# Patient Record
Sex: FEMALE | Race: WHITE | NOT HISPANIC OR LATINO | Employment: OTHER | URBAN - METROPOLITAN AREA
[De-identification: names, ages, dates, MRNs, and addresses within clinical notes are randomized per-mention and may not be internally consistent; named-entity substitution may affect disease eponyms.]

---

## 2019-10-21 ENCOUNTER — HOSPITAL ENCOUNTER (OUTPATIENT)
Dept: OTHER | Facility: HOSPITAL | Age: 74
Discharge: HOME | End: 2019-10-21

## 2020-11-18 ENCOUNTER — HOSPITAL ENCOUNTER (OUTPATIENT)
Dept: OTHER | Facility: HOSPITAL | Age: 75
Discharge: HOME | End: 2020-11-18

## 2022-04-07 ENCOUNTER — HOSPITAL ENCOUNTER (OUTPATIENT)
Dept: OTHER | Facility: HOSPITAL | Age: 77
Discharge: HOME | End: 2022-04-07

## 2023-06-26 ENCOUNTER — HOSPITAL ENCOUNTER (OUTPATIENT)
Dept: OTHER | Facility: HOSPITAL | Age: 78
Discharge: HOME | End: 2023-06-26

## 2024-07-23 ENCOUNTER — OFFICE VISIT (OUTPATIENT)
Dept: FAMILY MEDICINE CLINIC | Facility: CLINIC | Age: 79
End: 2024-07-23
Payer: MEDICARE

## 2024-07-23 VITALS
OXYGEN SATURATION: 94 % | HEIGHT: 62 IN | DIASTOLIC BLOOD PRESSURE: 64 MMHG | BODY MASS INDEX: 19.88 KG/M2 | HEART RATE: 73 BPM | WEIGHT: 108 LBS | SYSTOLIC BLOOD PRESSURE: 167 MMHG

## 2024-07-23 DIAGNOSIS — Z00.00 ENCOUNTER FOR MEDICAL EXAMINATION TO ESTABLISH CARE: Primary | ICD-10-CM

## 2024-07-23 DIAGNOSIS — J41.0 SIMPLE CHRONIC BRONCHITIS: ICD-10-CM

## 2024-07-23 DIAGNOSIS — Z86.73 HISTORY OF CVA (CEREBROVASCULAR ACCIDENT): ICD-10-CM

## 2024-07-23 DIAGNOSIS — Z78.0 POST-MENOPAUSAL: ICD-10-CM

## 2024-07-23 PROBLEM — J44.9 COPD (CHRONIC OBSTRUCTIVE PULMONARY DISEASE): Status: ACTIVE | Noted: 2024-07-23

## 2024-07-23 PROBLEM — I10 ESSENTIAL HYPERTENSION: Status: ACTIVE | Noted: 2024-07-23

## 2024-07-23 PROBLEM — E78.5 HYPERLIPIDEMIA: Status: ACTIVE | Noted: 2024-07-23

## 2024-07-23 PROCEDURE — 3078F DIAST BP <80 MM HG: CPT | Performed by: STUDENT IN AN ORGANIZED HEALTH CARE EDUCATION/TRAINING PROGRAM

## 2024-07-23 PROCEDURE — 99204 OFFICE O/P NEW MOD 45 MIN: CPT | Performed by: STUDENT IN AN ORGANIZED HEALTH CARE EDUCATION/TRAINING PROGRAM

## 2024-07-23 PROCEDURE — 3077F SYST BP >= 140 MM HG: CPT | Performed by: STUDENT IN AN ORGANIZED HEALTH CARE EDUCATION/TRAINING PROGRAM

## 2024-07-23 RX ORDER — ALBUTEROL SULFATE 90 UG/1
2 AEROSOL, METERED RESPIRATORY (INHALATION) EVERY 6 HOURS PRN
COMMUNITY
Start: 2024-03-04

## 2024-07-23 RX ORDER — CYCLOBENZAPRINE HCL 10 MG
10 TABLET ORAL 3 TIMES DAILY PRN
COMMUNITY
Start: 2024-02-26

## 2024-07-23 RX ORDER — CHOLECALCIFEROL (VITAMIN D3) 25 MCG
1000 TABLET ORAL DAILY
COMMUNITY

## 2024-07-23 RX ORDER — METOPROLOL SUCCINATE 50 MG/1
1 TABLET, EXTENDED RELEASE ORAL DAILY
COMMUNITY
Start: 2024-05-02

## 2024-07-23 RX ORDER — MECLIZINE HCL 12.5 MG/1
12.5 TABLET ORAL 3 TIMES DAILY PRN
COMMUNITY
Start: 2024-05-02 | End: 2024-07-23 | Stop reason: SDUPTHER

## 2024-07-23 RX ORDER — ROSUVASTATIN CALCIUM 20 MG/1
20 TABLET, COATED ORAL
COMMUNITY
Start: 2024-05-27

## 2024-07-23 RX ORDER — BIOTIN 10 MG
10 TABLET ORAL DAILY
COMMUNITY

## 2024-07-23 RX ORDER — VALSARTAN 160 MG/1
1 TABLET ORAL DAILY
COMMUNITY
Start: 2024-06-09 | End: 2024-07-23 | Stop reason: SDUPTHER

## 2024-07-23 RX ORDER — VALSARTAN 160 MG/1
160 TABLET ORAL DAILY
COMMUNITY
Start: 2024-06-09

## 2024-07-23 RX ORDER — ASPIRIN 81 MG/1
81 TABLET ORAL DAILY
Qty: 90 TABLET | Refills: 1 | Status: SHIPPED | OUTPATIENT
Start: 2024-07-23

## 2024-07-23 RX ORDER — MECLIZINE HCL 12.5 MG/1
12.5 TABLET ORAL EVERY 8 HOURS PRN
COMMUNITY
Start: 2024-05-02

## 2024-07-23 RX ORDER — ALBUTEROL SULFATE 2.5 MG/3ML
2.5 SOLUTION RESPIRATORY (INHALATION) EVERY 4 HOURS PRN
COMMUNITY
Start: 2024-03-12

## 2024-07-23 NOTE — PROGRESS NOTES
Subjective:       Yaneth Anderson is a 79 y.o. female with a concurrent medical history of essential hypertension, hyperlipidemia,  COPD, vertigo, and muscle cramps and a past medical history of CVA who presents to establish care.    In terms of essential hypertension, current medications include valsartan 160 mg and metoprolol succinate 50 mg.  Blood pressure currently uncontrolled at 167/64 - but she did not take her medications today. Yesterday 90/59. Ms. Anderson tells me that her blood pressure goes up and down. We did discuss that with history of CVA, we would like tighter control but also want to avoid hypotension.     GFR normal on CMP earlier this month.     Will have her follow up in three months.     In terms of hyperlipidemia, current medication includes rosuvastatin 20 mg. LDL>100 earlier this month on outside labwork. However, she says she was not fasting. We will wait on next lipid panel before making adjustments since she was not fasting.     In terms of COPD, current medication includes Symbicort and albuterol as needed. She has stopped smoking 24 years ago. Denies cough. Has dyspnea on exertion. Discussed that optimal regimen would be to have her on an antimuscarinic inhaler.  Would trial breztri inhaler.     In terms of vertigo, she takes meclizine as needed. She has seen ENT and is prescribed meclizine. We will work to get her outside records.     She takes flexeril for cramps in legs, feet, and hands.     She reports a past medical history of CVA 33 years ago. She was on aspirin previously but was taken off. Denies bleed.        Penicillin show up as an allergy but she denies side effect to penicillins or allergy to them.  She is not sure how this became a side effect for her.    The following portions of the patient's history were reviewed and updated as appropriate: allergies, current medications, past family history, past medical history, past social history, past surgical history, and problem  list.    Past Medical Hx:  Past Medical History:   Diagnosis Date    Asthma     Cancer     COPD (chronic obstructive pulmonary disease)     History of medical problems     Hyperlipidemia     Hypertension     Kidney stone     Stroke        Past Surgical Hx:  Past Surgical History:   Procedure Laterality Date    CARDIAC CATHETERIZATION      EYE SURGERY      HERNIA REPAIR      LARYNX SURGERY      TUBAL ABDOMINAL LIGATION         Current Meds:    Current Outpatient Medications:     albuterol (PROVENTIL) (2.5 MG/3ML) 0.083% nebulizer solution, Take 2.5 mg by nebulization Every 4 (Four) Hours As Needed., Disp: , Rfl:     albuterol sulfate  (90 Base) MCG/ACT inhaler, Inhale 2 puffs Every 6 (Six) Hours As Needed., Disp: , Rfl:     Biotin 10 MG tablet, Take 10 mg by mouth Daily., Disp: , Rfl:     Calcium Carbonate-Vit D-Min (Calcium 600 + Minerals) 600-200 MG-UNIT tablet, Take 1 tablet by mouth Daily., Disp: , Rfl:     Cholecalciferol 25 MCG (1000 UT) tablet, Take 1 tablet by mouth Daily., Disp: , Rfl:     cyanocobalamin (VITAMIN B-12) 1000 MCG tablet, Take 1 tablet by mouth Daily., Disp: , Rfl:     cyclobenzaprine (FLEXERIL) 10 MG tablet, Take 1 tablet by mouth 3 (Three) Times a Day As Needed., Disp: , Rfl:     meclizine (ANTIVERT) 12.5 MG tablet, Take 1 tablet by mouth Every 8 (Eight) Hours As Needed., Disp: , Rfl:     metoprolol succinate XL (TOPROL-XL) 50 MG 24 hr tablet, Take 1 tablet by mouth Daily., Disp: , Rfl:     rosuvastatin (CRESTOR) 20 MG tablet, Take 1 tablet by mouth every night at bedtime., Disp: , Rfl:     valsartan (DIOVAN) 160 MG tablet, Take 1 tablet by mouth Daily., Disp: , Rfl:     aspirin 81 MG EC tablet, Take 1 tablet by mouth Daily., Disp: 90 tablet, Rfl: 1    Budeson-Glycopyrrol-Formoterol (BREZTRI) 160-9-4.8 MCG/ACT aerosol inhaler, Inhale 2 puffs 2 (Two) Times a Day., Disp: 5.9 g, Rfl: 2    Allergies:  Allergies   Allergen Reactions    Penicillins Unknown - Low Severity    Tape Nausea Only  "and Other (See Comments)     \"Rips my skin\"       Family Hx:  Family History   Problem Relation Age of Onset    Hyperlipidemia Mother     Heart disease Mother     Hyperlipidemia Father     Heart disease Father     Diabetes Brother         Social History:  Social History     Socioeconomic History    Marital status:    Tobacco Use    Smoking status: Former     Types: Cigarettes     Passive exposure: Past    Smokeless tobacco: Never   Vaping Use    Vaping status: Never Used   Substance and Sexual Activity    Alcohol use: Not Currently    Drug use: Defer    Sexual activity: Defer       Review of Systems  Review of Systems   Respiratory:  Positive for shortness of breath (on exertion). Negative for cough and wheezing.        Objective:     /64 (BP Location: Right arm, Patient Position: Sitting)   Pulse 73   Ht 157.5 cm (62\")   Wt 49 kg (108 lb)   SpO2 94%   BMI 19.75 kg/m²   Physical Exam  Constitutional:       General: She is not in acute distress.     Appearance: Normal appearance. She is normal weight. She is not ill-appearing, toxic-appearing or diaphoretic.   Cardiovascular:      Rate and Rhythm: Normal rate and regular rhythm.   Pulmonary:      Effort: Pulmonary effort is normal.      Breath sounds: Normal breath sounds.   Neurological:      Mental Status: She is alert.   Psychiatric:         Mood and Affect: Mood normal.         Behavior: Behavior normal.          Assessment/Plan:     Diagnoses and all orders for this visit:    1. Encounter for medical examination to establish care (Primary)    2. History of CVA (cerebrovascular accident)    As mentioned elsewhere she mentions history of CVA but is not currently taking aspirin.  She says she was previously on aspirin but another provider took her off that she denies allergy or bleed.  Will restart aspirin for secondary prevention.    -     aspirin 81 MG EC tablet; Take 1 tablet by mouth Daily.  Dispense: 90 tablet; Refill: 1    3. " Post-menopausal  -     DEXA Bone Density Axial    4. Simple chronic bronchitis    inhaler.  Would trial breztri inhaler.     -     Budeson-Glycopyrrol-Formoterol (BREZTRI) 160-9-4.8 MCG/ACT aerosol inhaler; Inhale 2 puffs 2 (Two) Times a Day.  Dispense: 5.9 g; Refill: 2          Rx changes: Starting Breztri inhaler for COPD    Follow-up:     Return in about 3 months (around 10/23/2024) for Hypternsion .    Preventative:  Health Maintenance   Topic Date Due    DXA SCAN  Never done    HEPATITIS C SCREENING  Never done    COVID-19 Vaccine (6 - 2023-24 season) 07/24/2024 (Originally 9/1/2023)    Pneumococcal Vaccine 65+ (1 of 1 - PCV) 07/24/2024 (Originally 4/29/2011)    TDAP/TD VACCINES (1 - Tdap) 07/24/2024 (Originally 4/29/1964)    INFLUENZA VACCINE  08/01/2024    ANNUAL WELLNESS VISIT  07/02/2025    LIPID PANEL  07/03/2025    RSV Vaccine - Adults  Completed    ZOSTER VACCINE  Completed           This document has been electronically signed by Niels Fregoso MD on July 23, 2024 12:49 EDT       Parts of this note are electronic transcriptions/translations of spoken language to printed text using the Dragon Dictation system.

## 2024-08-01 DIAGNOSIS — Z78.0 POST-MENOPAUSAL: Primary | ICD-10-CM

## 2024-08-27 ENCOUNTER — HOSPITAL ENCOUNTER (OUTPATIENT)
Dept: BONE DENSITY | Facility: HOSPITAL | Age: 79
Discharge: HOME | End: 2024-08-27
Admitting: STUDENT IN AN ORGANIZED HEALTH CARE EDUCATION/TRAINING PROGRAM
Payer: MEDICARE

## 2024-08-27 PROCEDURE — 77080 DXA BONE DENSITY AXIAL: CPT

## 2024-09-10 ENCOUNTER — LAB (OUTPATIENT)
Dept: LAB | Facility: HOSPITAL | Age: 79
End: 2024-09-10
Payer: MEDICARE

## 2024-09-10 ENCOUNTER — OFFICE VISIT (OUTPATIENT)
Dept: FAMILY MEDICINE CLINIC | Facility: CLINIC | Age: 79
End: 2024-09-10
Payer: MEDICARE

## 2024-09-10 VITALS
OXYGEN SATURATION: 90 % | SYSTOLIC BLOOD PRESSURE: 142 MMHG | DIASTOLIC BLOOD PRESSURE: 50 MMHG | WEIGHT: 112 LBS | BODY MASS INDEX: 20.61 KG/M2 | HEART RATE: 74 BPM | HEIGHT: 62 IN

## 2024-09-10 DIAGNOSIS — M81.8 AGE-RELATED OSTEOPOROSIS WITHOUT FRACTURE: Primary | ICD-10-CM

## 2024-09-10 DIAGNOSIS — M81.8 AGE-RELATED OSTEOPOROSIS WITHOUT FRACTURE: ICD-10-CM

## 2024-09-10 DIAGNOSIS — K21.9 GASTROESOPHAGEAL REFLUX DISEASE, UNSPECIFIED WHETHER ESOPHAGITIS PRESENT: ICD-10-CM

## 2024-09-10 LAB
25(OH)D3 SERPL-MCNC: 81.7 NG/ML (ref 30–100)
CA-I SERPL ISE-MCNC: 1.4 MMOL/L (ref 1.15–1.35)
CALCIUM SPEC-SCNC: 10.2 MG/DL (ref 8.6–10.5)
PTH-INTACT SERPL-MCNC: 32 PG/ML (ref 15–65)

## 2024-09-10 PROCEDURE — 82310 ASSAY OF CALCIUM: CPT

## 2024-09-10 PROCEDURE — 3078F DIAST BP <80 MM HG: CPT | Performed by: STUDENT IN AN ORGANIZED HEALTH CARE EDUCATION/TRAINING PROGRAM

## 2024-09-10 PROCEDURE — 3077F SYST BP >= 140 MM HG: CPT | Performed by: STUDENT IN AN ORGANIZED HEALTH CARE EDUCATION/TRAINING PROGRAM

## 2024-09-10 PROCEDURE — 99213 OFFICE O/P EST LOW 20 MIN: CPT | Performed by: STUDENT IN AN ORGANIZED HEALTH CARE EDUCATION/TRAINING PROGRAM

## 2024-09-10 PROCEDURE — 82306 VITAMIN D 25 HYDROXY: CPT

## 2024-09-10 PROCEDURE — 82330 ASSAY OF CALCIUM: CPT

## 2024-09-10 PROCEDURE — 83970 ASSAY OF PARATHORMONE: CPT

## 2024-09-10 PROCEDURE — 36415 COLL VENOUS BLD VENIPUNCTURE: CPT

## 2024-09-10 RX ORDER — ALENDRONATE SODIUM 10 MG/1
10 TABLET ORAL
Qty: 90 TABLET | Refills: 1 | Status: SHIPPED | OUTPATIENT
Start: 2024-09-10

## 2024-09-10 RX ORDER — FLUTICASONE FUROATE, UMECLIDINIUM BROMIDE AND VILANTEROL TRIFENATATE 200; 62.5; 25 UG/1; UG/1; UG/1
POWDER RESPIRATORY (INHALATION)
Qty: 1 EACH | Refills: 0 | COMMUNITY
Start: 2024-09-10

## 2024-09-10 NOTE — PROGRESS NOTES
Subjective:       Yaneth Anderson is a 79 y.o. female with a concurrent medical history of essential hypertension, hyperlipidemia, COPD, osteoporosis, vertigo and past medical history of CVA who presents to discuss osteoporosis.     Yaneth has a history of osteoporosis recently diagnosed with DEXA scan ordered by myself on 8/27/2024.    She has been taking calcium and vitamin D supplementation for some time.     We discussed today that osteoporotic fractures are associated with increased risk of disability, nursing home placement, total healthcare costs, and mortality.    TSH and creatinine are normal.  Would look for other potential causes of secondary osteoporosis by ordering a vitamin D level, PTH, and ionized calcium.    We discussed the significant morbidity mortality associated with untreated osteoporosis and would recommend pharmacologic treatment today to reduce these risks.    Discussed treatment options.  She did indicate that she had been on bisphosphonate therapy many years ago and Prolia more recently but has not been on anything in approximately 1 year.  Would start her back on Fosamax and again discussed benefits and risk of bisphosphonate therapy as well as duration of treatment for approximately 5 years.    She was also provided with a Trelegy inhaler and told not to use Symbicort for COPD.        The following portions of the patient's history were reviewed and updated as appropriate: allergies, current medications, past family history, past medical history, past social history, past surgical history, and problem list.    Past Medical Hx:  Past Medical History:   Diagnosis Date    Asthma     Cancer     COPD (chronic obstructive pulmonary disease)     History of medical problems     Hyperlipidemia     Hypertension     Kidney stone     Stroke        Past Surgical Hx:  Past Surgical History:   Procedure Laterality Date    CARDIAC CATHETERIZATION      EYE SURGERY      HERNIA REPAIR      LARYNX SURGERY   "    TUBAL ABDOMINAL LIGATION         Current Meds:    Current Outpatient Medications:     Calcium Carbonate-Vit D-Min (Calcium 600 + Minerals) 600-200 MG-UNIT tablet, Take 1 tablet by mouth Daily., Disp: 90 each, Rfl: 1    albuterol (PROVENTIL) (2.5 MG/3ML) 0.083% nebulizer solution, Take 2.5 mg by nebulization Every 4 (Four) Hours As Needed., Disp: , Rfl:     albuterol sulfate  (90 Base) MCG/ACT inhaler, Inhale 2 puffs Every 6 (Six) Hours As Needed., Disp: , Rfl:     alendronate (FOSAMAX) 10 MG tablet, Take 1 tablet by mouth Every Morning Before Breakfast., Disp: 90 tablet, Rfl: 1    aspirin 81 MG EC tablet, Take 1 tablet by mouth Daily., Disp: 90 tablet, Rfl: 1    Biotin 10 MG tablet, Take 10 mg by mouth Daily., Disp: , Rfl:     Budeson-Glycopyrrol-Formoterol (BREZTRI) 160-9-4.8 MCG/ACT aerosol inhaler, Inhale 2 puffs 2 (Two) Times a Day., Disp: 5.9 g, Rfl: 2    cyanocobalamin (VITAMIN B-12) 1000 MCG tablet, Take 1 tablet by mouth Daily., Disp: , Rfl:     cyclobenzaprine (FLEXERIL) 10 MG tablet, Take 1 tablet by mouth 3 (Three) Times a Day As Needed., Disp: , Rfl:     meclizine (ANTIVERT) 12.5 MG tablet, Take 1 tablet by mouth Every 8 (Eight) Hours As Needed., Disp: , Rfl:     metoprolol succinate XL (TOPROL-XL) 50 MG 24 hr tablet, Take 1 tablet by mouth Daily., Disp: , Rfl:     rosuvastatin (CRESTOR) 20 MG tablet, Take 1 tablet by mouth every night at bedtime., Disp: , Rfl:     Trelegy Ellipta 200-62.5-25 MCG/ACT inhaler, GTIN (01) [id number] EXP 2026-JAN LOT (10 NC5K S/N (21) [id number], Disp: 1 each, Rfl: 0    valsartan (DIOVAN) 160 MG tablet, Take 1 tablet by mouth Daily., Disp: , Rfl:     Allergies:  Allergies   Allergen Reactions    Penicillins Unknown - Low Severity    Tape Nausea Only and Other (See Comments)     \"Rips my skin\"       Family Hx:  Family History   Problem Relation Age of Onset    Hyperlipidemia Mother     Heart disease Mother     Hyperlipidemia Father     Heart disease Father " "    Diabetes Brother         Social History:  Social History     Socioeconomic History    Marital status:    Tobacco Use    Smoking status: Former     Current packs/day: 0.00     Types: Cigarettes     Quit date: 2000     Years since quittin.6     Passive exposure: Past    Smokeless tobacco: Never   Vaping Use    Vaping status: Never Used   Substance and Sexual Activity    Alcohol use: Not Currently    Drug use: Defer    Sexual activity: Defer       Review of Systems  Review of Systems   Constitutional:  Negative for unexpected weight change.       Objective:     /50   Pulse 74   Ht 157.5 cm (62\")   Wt 50.8 kg (112 lb)   SpO2 90%   BMI 20.49 kg/m²   Physical Exam  Constitutional:       General: She is not in acute distress.     Appearance: Normal appearance. She is normal weight. She is not ill-appearing, toxic-appearing or diaphoretic.   Pulmonary:      Effort: Pulmonary effort is normal. No respiratory distress.   Neurological:      Mental Status: She is alert.          Assessment/Plan:     Diagnoses and all orders for this visit:    1. Age-related osteoporosis without fracture (Primary)    Yaneth has a history of osteoporosis recently diagnosed with DEXA scan ordered by myself on 2024.    She has been taking calcium and vitamin D supplementation for some time.     We discussed today that osteoporotic fractures are associated with increased risk of disability, nursing home placement, total healthcare costs, and mortality.    TSH and creatinine are normal.  Would look for other potential causes of secondary osteoporosis by ordering a vitamin D level, PTH, and ionized calcium.    We discussed the significant morbidity mortality associated with untreated osteoporosis and would recommend pharmacologic treatment today to reduce these risks.    Discussed treatment options.  She did indicate that she had been on bisphosphonate therapy many years ago and Prolia more recently but has not been " on anything in approximately 1 year.  Would start her back on Fosamax and again discussed benefits and risk of bisphosphonate therapy as well as duration of treatment for approximately 5 years.    She was also provided with a Trelegy inhaler and told not to use Symbicort for COPD.    -     Calcium, Ionized; Future  -     PTH, Intact & Calcium; Future  -     Vitamin D 25 hydroxy; Future  -     Calcium Carbonate-Vit D-Min (Calcium 600 + Minerals) 600-200 MG-UNIT tablet; Take 1 tablet by mouth Daily.  Dispense: 90 each; Refill: 1  -     alendronate (FOSAMAX) 10 MG tablet; Take 1 tablet by mouth Every Morning Before Breakfast.  Dispense: 90 tablet; Refill: 1      Other orders  -     Trelegy Ellipta 200-62.5-25 MCG/ACT inhaler; GTIN (01) [id number]  EXP 2026-JAN  LOT (10) NC5K  S/N (21) [id number]  Dispense: 1 each; Refill: 0          Rx changes: Starting Fosamax 10 mg      Follow-up:     Return if symptoms worsen or fail to improve, for Next scheduled follow up.    Preventative:  Health Maintenance   Topic Date Due    HEPATITIS C SCREENING  Never done    INFLUENZA VACCINE  08/01/2024    COVID-19 Vaccine (6 - 2023-24 season) 09/11/2024 (Originally 9/1/2024)    TDAP/TD VACCINES (1 - Tdap) 09/11/2024 (Originally 4/29/1964)    ANNUAL WELLNESS VISIT  07/02/2025    LIPID PANEL  07/03/2025    DXA SCAN  08/27/2026    RSV Vaccine - Adults  Completed    Pneumococcal Vaccine 65+  Completed    ZOSTER VACCINE  Completed           This document has been electronically signed by Niels Fregoso MD on September 10, 2024 12:55 EDT       Parts of this note are electronic transcriptions/translations of spoken language to printed text using the Dragon Dictation system.

## 2024-09-12 NOTE — PROGRESS NOTES
Elevation in ionized calcium likely due to calcium supplementation and not to a pathological process such as hyperparathyroidism.  Vitamin D level normal.  Based on these findings, I would not recommend further workup for secondary causes of osteoporosis.    ?  This document has been electronically signed by Niels Fregoso MD on September 12, 2024 08:06 EDT

## 2024-09-20 RX ORDER — VALSARTAN 160 MG/1
160 TABLET ORAL DAILY
Qty: 30 TABLET | Refills: 0 | Status: SHIPPED | OUTPATIENT
Start: 2024-09-20

## 2024-10-21 ENCOUNTER — TELEPHONE (OUTPATIENT)
Dept: FAMILY MEDICINE CLINIC | Facility: CLINIC | Age: 79
End: 2024-10-21
Payer: MEDICARE

## 2024-10-21 NOTE — TELEPHONE ENCOUNTER
Caller: Yaneth Anderson    Relationship: Self    Best call back number: [phone]     Who is your current provider: JUANITO ALMENDAREZ    Is your current provider offboarding? NO    Who would you like your new provider to be: KHUSHBOO DEL TORO    What are your reasons for transferring care: DON'T FEEL COMFORTABLE WITH DR. ALMENDAREZ    Additional notes: PLEASE CALL AND ADVISE

## 2024-10-23 NOTE — TELEPHONE ENCOUNTER
Provider: JUANITO ALMENDAREZ    Caller: RUBI OBRIEN         Phone Number: [phone]      Reason for Call:        THE PATIENT SAID SHE WILL WAIT TO RESCHEDULE HER APPOINTMENT WHEN SHE HEARS ABOUT THE TRANSFER OF CARE. SHE SAID SHE PREFERS A FEMALE PROVIDER

## 2024-10-24 NOTE — TELEPHONE ENCOUNTER
Of course! Please let Yaneth know I am grateful to have had the privilege to take care of her, and I know she will have a wonderful time with Dr. Stevenson.     Thank you,    Niels Fregoso

## 2024-10-28 ENCOUNTER — TELEPHONE (OUTPATIENT)
Dept: FAMILY MEDICINE CLINIC | Facility: CLINIC | Age: 79
End: 2024-10-28
Payer: MEDICARE

## 2024-10-28 NOTE — TELEPHONE ENCOUNTER
Caller: Yaneth Anderson    Relationship: Self    Best call back number: [phone]     What is the best time to reach you: ANY    Who are you requesting to speak with (clinical staff, provider,  specific staff member): CLINICAL STAFF    What was the call regarding: PATIENT CALLED WANTING TO MAKE SURE THAT HER RECENT SURGERY FOR HER NOSE WILL BE IN HER CHART BEFORE HER VISIT; IT WAS ORDERED BY GIOVANNA HINTON.

## 2024-11-04 ENCOUNTER — OFFICE VISIT (OUTPATIENT)
Dept: FAMILY MEDICINE CLINIC | Facility: CLINIC | Age: 79
End: 2024-11-04
Payer: MEDICARE

## 2024-11-04 VITALS
TEMPERATURE: 97.5 F | OXYGEN SATURATION: 93 % | DIASTOLIC BLOOD PRESSURE: 64 MMHG | HEART RATE: 70 BPM | HEIGHT: 62 IN | SYSTOLIC BLOOD PRESSURE: 139 MMHG | WEIGHT: 112 LBS | BODY MASS INDEX: 20.61 KG/M2

## 2024-11-04 DIAGNOSIS — Z86.73 HISTORY OF CVA (CEREBROVASCULAR ACCIDENT): ICD-10-CM

## 2024-11-04 DIAGNOSIS — Z00.00 ENCOUNTER FOR MEDICAL EXAMINATION TO ESTABLISH CARE: ICD-10-CM

## 2024-11-04 DIAGNOSIS — R60.0 LEG EDEMA: ICD-10-CM

## 2024-11-04 DIAGNOSIS — I10 ESSENTIAL HYPERTENSION: ICD-10-CM

## 2024-11-04 DIAGNOSIS — Z23 NEED FOR INFLUENZA VACCINATION: Primary | ICD-10-CM

## 2024-11-04 DIAGNOSIS — R42 VERTIGO: ICD-10-CM

## 2024-11-04 DIAGNOSIS — M81.8 AGE-RELATED OSTEOPOROSIS WITHOUT FRACTURE: ICD-10-CM

## 2024-11-04 DIAGNOSIS — Z12.31 ENCOUNTER FOR SCREENING MAMMOGRAM FOR MALIGNANT NEOPLASM OF BREAST: ICD-10-CM

## 2024-11-04 DIAGNOSIS — J42 CHRONIC BRONCHITIS, UNSPECIFIED CHRONIC BRONCHITIS TYPE: ICD-10-CM

## 2024-11-04 DIAGNOSIS — E78.00 PURE HYPERCHOLESTEROLEMIA: ICD-10-CM

## 2024-11-04 DIAGNOSIS — I73.9 PVD (PERIPHERAL VASCULAR DISEASE): ICD-10-CM

## 2024-11-04 PROCEDURE — 90662 IIV NO PRSV INCREASED AG IM: CPT | Performed by: FAMILY MEDICINE

## 2024-11-04 PROCEDURE — 3075F SYST BP GE 130 - 139MM HG: CPT | Performed by: FAMILY MEDICINE

## 2024-11-04 PROCEDURE — 99214 OFFICE O/P EST MOD 30 MIN: CPT | Performed by: FAMILY MEDICINE

## 2024-11-04 PROCEDURE — G0008 ADMIN INFLUENZA VIRUS VAC: HCPCS | Performed by: FAMILY MEDICINE

## 2024-11-04 PROCEDURE — 3078F DIAST BP <80 MM HG: CPT | Performed by: FAMILY MEDICINE

## 2024-11-04 RX ORDER — VALSARTAN 160 MG/1
160 TABLET ORAL DAILY
Qty: 90 TABLET | Refills: 3 | Status: SHIPPED | OUTPATIENT
Start: 2024-11-04

## 2024-11-04 RX ORDER — METOPROLOL SUCCINATE 50 MG/1
50 TABLET, EXTENDED RELEASE ORAL DAILY
Qty: 90 TABLET | Refills: 3 | Status: SHIPPED | OUTPATIENT
Start: 2024-11-04

## 2024-11-04 RX ORDER — BUDESONIDE AND FORMOTEROL FUMARATE DIHYDRATE 160; 4.5 UG/1; UG/1
2 AEROSOL RESPIRATORY (INHALATION)
Qty: 10.2 G | Refills: 11 | Status: SHIPPED | OUTPATIENT
Start: 2024-11-04

## 2024-11-04 RX ORDER — MECLIZINE HCL 12.5 MG 12.5 MG/1
12.5 TABLET ORAL EVERY 8 HOURS PRN
Qty: 90 TABLET | Refills: 1 | Status: SHIPPED | OUTPATIENT
Start: 2024-11-04

## 2024-11-04 RX ORDER — ALENDRONATE SODIUM 10 MG/1
10 TABLET ORAL
Qty: 90 TABLET | Refills: 3 | Status: SHIPPED | OUTPATIENT
Start: 2024-11-04

## 2024-11-04 RX ORDER — CYCLOBENZAPRINE HCL 10 MG
10 TABLET ORAL 3 TIMES DAILY PRN
Qty: 90 TABLET | Refills: 1 | Status: SHIPPED | OUTPATIENT
Start: 2024-11-04

## 2024-11-04 RX ORDER — ROSUVASTATIN CALCIUM 20 MG/1
20 TABLET, COATED ORAL
Qty: 90 TABLET | Refills: 3 | Status: SHIPPED | OUTPATIENT
Start: 2024-11-04

## 2024-11-04 RX ORDER — ASPIRIN 81 MG
1 TABLET, DELAYED RELEASE (ENTERIC COATED) ORAL DAILY
COMMUNITY
Start: 2024-09-11

## 2024-11-04 NOTE — PROGRESS NOTES
Chief Complaint  Establish Care (TRANSFER OF CARE FROM DR ALMENDAREZ) and Referral ([place])    Subjective          Yaneth Anderson presents to [facility]  History of Present Illness    History of Present Illness  The patient is a 79-year-old female who presents to [facility] care. She would like her flu shot today and to update her mammogram.    She recently had a DEXA scan on 08/27/2024 that showed osteoporosis. She was restarted on alendronate and takes a vitamin D and calcium supplement. Her labs were completed in 07/2024 and were essentially unremarkable except for elevated cholesterol with an LDL of 122. She is on Crestor for cholesterol management. She has been on several bisphosphonate therapies, including Fosamax, Prolia, and Reclast. One of these treatments caused damage to her nails and skin, leading her to start taking biotin. She also lost almost all her hair, which did not regrow.    Her blood pressure is elevated today at 166/58, but she states that her nerves are elevated today. She is currently on two or three medications for high blood pressure, one of which she has only four pills left and cannot recall the name. Her blood pressure tends to be high when she is rushing but normalizes once she calms down. She monitors her blood pressure two to three times daily to track its fluctuations. She is due for a few medication refills. She has sufficient albuterol inhalers and recently restarted Fosamax, with enough supply until the first part of next month. She cannot afford the Breztri or trelegy inhalers and continues with the two she was already using (albuterol and Symbicort). She takes over-the-counter calcium, vitamin D, and B12. She has enough Flexeril for another month or two.     She reports swelling and a purple discoloration in both feet. She underwent two ablations on each leg in 11/2023. She has very narrow veins and poor blood circulation to her feet. The swelling  "sometimes subsides at night, but not always. She does not experience numbness or tingling in her feet. She has reduced her salt intake and wears compression socks more frequently in the winter. She saw her heart specialist last year and was told her heart is fine and she does not need to return.    She has a history of vertigo and was informed it is permanent. She takes meclizine as needed, previously taking it three times a day during extreme episodes. She had a stroke at 50, which resulted in vision loss.  Chronic loss of the left upper quarter vision. she experienced severe reactions to menopause after her stroke, which lasted for years and are now returning.  This also coincides with restarting the Fosamax. she does not have hot flashes but experiences heat waves that last for extended periods. She is unsure if her blood pressure increases during these episodes. She does not have insomnia and sleeps well.    She has constant shortness of breath and has had pulmonary function tests in the past. She has COPD and takes Symbicort 160 twice a day.        Objective   Vital Signs:   /64   Pulse 70   Temp 97.5 °F (36.4 °C) (Oral)   Ht 157.5 cm (62\")   Wt 50.8 kg (112 lb)   SpO2 93%   BMI 20.49 kg/m²     Physical Exam  Vitals reviewed.   Constitutional:       General: She is not in acute distress.     Appearance: Normal appearance.   HENT:      Head: Normocephalic and atraumatic.      Mouth/Throat:      Mouth: Mucous membranes are moist.   Eyes:      Conjunctiva/sclera: Conjunctivae normal.   Neck:      Thyroid: No thyromegaly.   Cardiovascular:      Rate and Rhythm: Normal rate and regular rhythm.      Heart sounds: Normal heart sounds.   Pulmonary:      Effort: Pulmonary effort is normal.      Breath sounds: Normal breath sounds.   Musculoskeletal:      Cervical back: Normal range of motion and neck supple.      Right lower leg: Edema present.      Left lower leg: Edema present.      Comments: Purplish " discoloration to toes and feet bilaterally with 3+ pitting edema to feet dissipates in the shins   Lymphadenopathy:      Cervical: No cervical adenopathy.   Skin:     General: Skin is warm.   Neurological:      General: No focal deficit present.      Mental Status: She is alert.   Psychiatric:         Mood and Affect: Mood normal.         Behavior: Behavior normal.          Physical Exam  Vital Signs  Blood pressure reading is 139/64.    Result Review :       Common labs          7/3/2024    13:35 9/10/2024    12:41   Common Labs   Calcium  10.2    WBC 7.9        Hemoglobin 15.0        Hematocrit 49.7        Platelets 264        Total Cholesterol 240        Triglycerides 135        HDL Cholesterol 91        LDL Cholesterol  122           Details          This result is from an external source.               Results for orders placed or performed in visit on 09/10/24   Calcium, Ionized    Collection Time: 09/10/24 12:41 PM    Specimen: Blood   Result Value Ref Range    Ionized Calcium 1.40 (H) 1.15 - 1.35 mmol/L   PTH, Intact & Calcium    Collection Time: 09/10/24 12:41 PM    Specimen: Blood   Result Value Ref Range    PTH, Intact 32.0 15.0 - 65.0 pg/mL    Calcium 10.2 8.6 - 10.5 mg/dL   Vitamin D 25 hydroxy    Collection Time: 09/10/24 12:41 PM    Specimen: Blood   Result Value Ref Range    25 Hydroxy, Vitamin D 81.7 30.0 - 100.0 ng/ml       Results  Laboratory Studies  LDL cholesterol is 122.    Imaging  DEXA scan showed osteoporosis.           Procedures        Assessment and Plan    Diagnoses and all orders for this visit:    1. Need for influenza vaccination (Primary)  -     Fluzone High-Dose 65+yrs ([id number])    2. Encounter for screening mammogram for malignant neoplasm of breast  -     Mammo Screening Digital Tomosynthesis Bilateral With CAD; Future    3. Encounter for medical examination to establish care    4. Essential hypertension  -     metoprolol succinate XL (TOPROL-XL) 50 MG 24 hr tablet; Take 1  tablet by mouth Daily.  Dispense: 90 tablet; Refill: 3  -     valsartan (DIOVAN) 160 MG tablet; Take 1 tablet by mouth Daily.  Dispense: 90 tablet; Refill: 3    5. Pure hypercholesterolemia  -     rosuvastatin (CRESTOR) 20 MG tablet; Take 1 tablet by mouth every night at bedtime.  Dispense: 90 tablet; Refill: 3    6. History of CVA (cerebrovascular accident)    7. Chronic bronchitis, unspecified chronic bronchitis type  -     budesonide-formoterol (Symbicort) 160-4.5 MCG/ACT inhaler; Inhale 2 puffs 2 (Two) Times a Day.  Dispense: 10.2 g; Refill: 11    8. Age-related osteoporosis without fracture  -     alendronate (FOSAMAX) 10 MG tablet; Take 1 tablet by mouth Every Morning Before Breakfast.  Dispense: 90 tablet; Refill: 3    9. Vertigo  -     meclizine (ANTIVERT) 12.5 MG tablet; Take 1 tablet by mouth Every 8 (Eight) Hours As Needed for Dizziness.  Dispense: 90 tablet; Refill: 1    10. PVD (peripheral vascular disease)    11. Leg edema    Other orders  -     cyclobenzaprine (FLEXERIL) 10 MG tablet; Take 1 tablet by mouth 3 (Three) Times a Day As Needed for Muscle Spasms.  Dispense: 90 tablet; Refill: 1        Assessment & Plan  1. Osteoporosis.  She was restarted on alendronate and takes a vitamin D and calcium supplement. She will stop taking Fosamax for the next month to determine if it is causing her hot flashes. If the hot flashes resolve, she will be restarted on Fosamax. If the hot flashes persist, alternative treatments for hot flashes will be considered.    2. Elevated cholesterol.  Her labs from July 2024 showed elevated cholesterol with an LDL of 122. She is currently on Crestor (rosuvastatin) for cholesterol management. She should continue her current medication regimen.    3. Hypertension.  Her blood pressure was elevated today at 166/58, but it improved to 139/64 by the end of the visit. She will keep a home log of her blood pressure readings at different times of the day for a week. If her blood  pressure remains high, further adjustments to her medication may be necessary. She is currently on metoprolol and valsartan.    4. Peripheral edema.  She reports swelling and purple discoloration in both feet, which sometimes improves at night. She has a history of two ablations on each leg last year. She is advised to wear compression socks daily when her feet are not elevated and to limit salt intake. If the swelling worsens, a referral to a vascular specialist will be considered.    5. COPD.  She is on Symbicort 160 mcg twice a day and albuterol as needed. She does not need a refill of her albuterol inhalers at this time. She will check if she needs more nebulizer solution and inform the office. Pulmonary function testing (PFT) will be considered if her symptoms worsen.    6. Vertigo.  She has a history of vertigo and takes meclizine as needed. She should continue to keep meclizine on hand for episodes of vertigo.    7. Menopausal symptoms.  She experiences severe hot flashes and sweating, which have returned recently. She will monitor her symptoms while off Fosamax for a month to determine if it is the cause. If symptoms persist, non-hormonal options or clonidine will be considered.    8. Health Maintenance.  She will receive her flu shot today. An updated mammogram will be scheduled.        {BMI is within normal parameters. No other follow-up for BMI required.      Follow Up   Return in 6 months (on 5/4/2025) for wellness due July 2025, Recheck.  Patient was given instructions and counseling regarding her condition or for health maintenance advice. Please see specific information pulled into the AVS if appropriate.   Patient Instructions   Edema: Recommend elevating feet/legs when able throughout the day, recommend wearing compression socks daily whenever not able to elevate legs, recommend limiting salt in diet/no added salt.       Transcribed from ambient dictation for Shola Stevenson DO by Shola Stevenson  .  11/04/24   10:36 EST    Patient or patient representative verbalized consent for the use of Ambient Listening during the visit with  Shola Stevenson DO for chart documentation. 11/4/2024  11:10 EST

## 2024-11-04 NOTE — PATIENT INSTRUCTIONS
Edema: Recommend elevating feet/legs when able throughout the day, recommend wearing compression socks daily whenever not able to elevate legs, recommend limiting salt in diet/no added salt.

## 2024-12-03 ENCOUNTER — TELEPHONE (OUTPATIENT)
Dept: FAMILY MEDICINE CLINIC | Facility: CLINIC | Age: 79
End: 2024-12-03
Payer: MEDICARE

## 2024-12-03 DIAGNOSIS — J42 CHRONIC BRONCHITIS, UNSPECIFIED CHRONIC BRONCHITIS TYPE: Primary | ICD-10-CM

## 2024-12-03 RX ORDER — ALBUTEROL SULFATE 0.83 MG/ML
2.5 SOLUTION RESPIRATORY (INHALATION) EVERY 4 HOURS PRN
Qty: 120 ML | Refills: 2 | Status: SHIPPED | OUTPATIENT
Start: 2024-12-03

## 2024-12-03 NOTE — TELEPHONE ENCOUNTER
Patient called and wanted to let Dr. Stevenson know that she does not have any albuterol solution for her nebulizer and is wanting to know if she can put an order in for her. Requesting a call back

## 2024-12-26 ENCOUNTER — HOSPITAL ENCOUNTER (OUTPATIENT)
Dept: MAMMOGRAPHY | Facility: HOSPITAL | Age: 79
Discharge: HOME | End: 2024-12-26
Admitting: FAMILY MEDICINE
Payer: MEDICARE

## 2024-12-26 DIAGNOSIS — Z12.31 ENCOUNTER FOR SCREENING MAMMOGRAM FOR MALIGNANT NEOPLASM OF BREAST: ICD-10-CM

## 2024-12-26 PROCEDURE — 77067 SCR MAMMO BI INCL CAD: CPT

## 2024-12-26 PROCEDURE — 77063 BREAST TOMOSYNTHESIS BI: CPT

## 2025-02-24 ENCOUNTER — TELEPHONE (OUTPATIENT)
Dept: FAMILY MEDICINE CLINIC | Facility: CLINIC | Age: 80
End: 2025-02-24
Payer: MEDICARE

## 2025-02-24 NOTE — TELEPHONE ENCOUNTER
HUB TO SCHEDULE & RELAY:     Called patient to reschedule appt on 5/5/25 due to provider being out of the office. LVM.

## 2025-02-25 NOTE — TELEPHONE ENCOUNTER
HUB TO SCHEDULE & RELAY:     2ND ATTEMPT: Called patient to reschedule appt on 5/5/25 due to provider being out of the office. LVM.

## 2025-02-26 NOTE — TELEPHONE ENCOUNTER
HUB TO SCHEDULE & RELAY:     3RD ATTEMPT: Called patient to reschedule appt on 5/5/25 due to provider being out of the office. LVM.

## 2025-05-07 ENCOUNTER — OFFICE VISIT (OUTPATIENT)
Dept: FAMILY MEDICINE CLINIC | Facility: CLINIC | Age: 80
End: 2025-05-07
Payer: MEDICARE

## 2025-05-07 VITALS
DIASTOLIC BLOOD PRESSURE: 64 MMHG | OXYGEN SATURATION: 95 % | HEART RATE: 99 BPM | HEIGHT: 62 IN | SYSTOLIC BLOOD PRESSURE: 152 MMHG | BODY MASS INDEX: 21.05 KG/M2 | WEIGHT: 114.4 LBS

## 2025-05-07 DIAGNOSIS — E78.00 PURE HYPERCHOLESTEROLEMIA: ICD-10-CM

## 2025-05-07 DIAGNOSIS — J42 CHRONIC BRONCHITIS, UNSPECIFIED CHRONIC BRONCHITIS TYPE: ICD-10-CM

## 2025-05-07 DIAGNOSIS — I10 ESSENTIAL HYPERTENSION: ICD-10-CM

## 2025-05-07 DIAGNOSIS — T75.3XXA MOTION SICKNESS, INITIAL ENCOUNTER: ICD-10-CM

## 2025-05-07 DIAGNOSIS — R10.9 ABDOMINAL PAIN, UNSPECIFIED ABDOMINAL LOCATION: ICD-10-CM

## 2025-05-07 DIAGNOSIS — Z00.00 MEDICARE ANNUAL WELLNESS VISIT, SUBSEQUENT: Primary | ICD-10-CM

## 2025-05-07 DIAGNOSIS — Z12.31 ENCOUNTER FOR SCREENING MAMMOGRAM FOR MALIGNANT NEOPLASM OF BREAST: ICD-10-CM

## 2025-05-07 DIAGNOSIS — R42 VERTIGO: ICD-10-CM

## 2025-05-07 RX ORDER — SCOPOLAMINE 1 MG/3D
1 PATCH, EXTENDED RELEASE TRANSDERMAL
Qty: 5 PATCH | Refills: 0 | Status: SHIPPED | OUTPATIENT
Start: 2025-05-07 | End: 2025-05-22

## 2025-05-07 NOTE — PATIENT INSTRUCTIONS
Motion sickness meds:    Meclizine(non drowsy dramamine)  Diphenhydramine (regular Dramamine)  Scoloplamine patches - good for 72 hours.       Reminder to complete fasting labs after July.

## 2025-05-07 NOTE — PROGRESS NOTES
" Subjective   The ABCs of the Annual Wellness Visit  Medicare Wellness Visit      Yaneth Anderson is a 80 y.o. patient who presents for a Medicare Wellness Visit.    The following portions of the patient's history were reviewed and   updated as appropriate: {history reviewed:20406::\"allergies\",\"current medications\",\"past family history\",\"past medical history\",\"past social history\",\"past surgical history\",\"problem list\"}.    Compared to one year ago, the patient's physical   health is {better worse same:83982}.  Compared to one year ago, the patient's mental   health is {better worse same:93323}.    Recent Hospitalizations:  {Hospital Admission Status in the last 365 days:04809}    Current Medical Providers:  Patient Care Team:  Shola Stevenson DO as PCP - General (Family Medicine)    Outpatient Medications Prior to Visit   Medication Sig Dispense Refill   • albuterol (PROVENTIL) (2.5 MG/3ML) 0.083% nebulizer solution Take 2.5 mg by nebulization Every 4 (Four) Hours As Needed for Shortness of Air. 120 mL 2   • albuterol sulfate  (90 Base) MCG/ACT inhaler Inhale 2 puffs Every 6 (Six) Hours As Needed.     • alendronate (FOSAMAX) 10 MG tablet Take 1 tablet by mouth Every Morning Before Breakfast. 90 tablet 3   • aspirin 81 MG EC tablet Take 1 tablet by mouth Daily. 90 tablet 1   • Biotin 10 MG tablet Take 10 mg by mouth Daily.     • budesonide-formoterol (Symbicort) 160-4.5 MCG/ACT inhaler Inhale 2 puffs 2 (Two) Times a Day. 10.2 g 11   • Calcium Carb-Cholecalciferol 600-5 MG-MCG tablet Take 1 tablet by mouth Daily.     • Calcium Carbonate-Vit D-Min (Calcium 600 + Minerals) 600-200 MG-UNIT tablet Take 1 tablet by mouth Daily. 90 each 1   • cyanocobalamin (VITAMIN B-12) 1000 MCG tablet Take 1 tablet by mouth Daily.     • cyclobenzaprine (FLEXERIL) 10 MG tablet Take 1 tablet by mouth 3 (Three) Times a Day As Needed for Muscle Spasms. 90 tablet 1   • meclizine (ANTIVERT) 12.5 MG tablet Take 1 tablet by mouth Every 8 " "(Eight) Hours As Needed for Dizziness. 90 tablet 1   • metoprolol succinate XL (TOPROL-XL) 50 MG 24 hr tablet Take 1 tablet by mouth Daily. 90 tablet 3   • rosuvastatin (CRESTOR) 20 MG tablet Take 1 tablet by mouth every night at bedtime. 90 tablet 3   • valsartan (DIOVAN) 160 MG tablet Take 1 tablet by mouth Daily. 90 tablet 3     No facility-administered medications prior to visit.     No opioid medication identified on active medication list. I have reviewed chart for other potential  high risk medication/s and harmful drug interactions in the elderly.      Aspirin is on active medication list. {ASPIRIN ON MEDICATION LIST INDICATED/NOT INDICATED:35752}.      Patient Active Problem List   Diagnosis   • Essential hypertension   • Hyperlipidemia   • History of CVA (cerebrovascular accident)   • COPD (chronic obstructive pulmonary disease)   • Age-related osteoporosis without fracture     Advance Care Planning {Advance Care Planning Hyperlink:23}Advance Directive is not on file.  {ACP Discussion, Advance Directive not in EMR:08650}            Objective   Vitals:    05/07/25 1005   BP: 152/64   BP Location: Right arm   Patient Position: Sitting   Cuff Size: Adult   Pulse: 99   SpO2: 95%   Weight: 51.9 kg (114 lb 6.4 oz)   Height: 157.5 cm (62\")   PainSc: 0-No pain       Estimated body mass index is 20.92 kg/m² as calculated from the following:    Height as of this encounter: 157.5 cm (62\").    Weight as of this encounter: 51.9 kg (114 lb 6.4 oz).    BMI is within normal parameters. No other follow-up for BMI required.    {Help Text;  If you change Medicare Wellness reason for visit to a Welcome to Medicare reason for visit after the encounter has started, please add SmartPhrase .[email or web address] to your note for proper gait and balance documentation. This text will disappear after the note is signed:76089}       Does the patient have evidence of cognitive impairment? {Yes/No:14429}                                           "                                                      Health  Risk Assessment    Smoking Status:  Social History     Tobacco Use   Smoking Status Former   • Current packs/day: 0.00   • Average packs/day: 1 pack/day for 34.1 years (34.1 ttl pk-yrs)   • Types: Cigarettes   • Start date: 1966   • Quit date: 2000   • Years since quittin.2   • Passive exposure: Past   Smokeless Tobacco Never   Tobacco Comments    Approximate  dates     Alcohol Consumption:  Social History     Substance and Sexual Activity   Alcohol Use Never       Fall Risk Screen{Jump to Steadi Fall Risk Flowsheet:23}  STEADI Fall Risk Assessment was completed, and patient is at LOW risk for falls.Assessment completed on:2025    Depression Screening{Jump to PHQ SmartForm:23}   Little interest or pleasure in doing things? Not at all   Feeling down, depressed, or hopeless? Not at all   PHQ-2 Total Score 0      Health Habits and Functional and Cognitive Screenin/7/2025    10:00 AM   Functional & Cognitive Status   Do you have difficulty preparing food and eating? No   Do you have difficulty bathing yourself, getting dressed or grooming yourself? No   Do you have difficulty using the toilet? No   Do you have difficulty moving around from place to place? No   Do you have trouble with steps or getting out of a bed or a chair? No   Current Diet Limited Junk Food   Dental Exam Up to date   Eye Exam Up to date   Exercise (times per week) 0 times per week   Do you need help using the phone?  No   Are you deaf or do you have serious difficulty hearing?  No   Do you need help to go to places out of walking distance? No   Do you need help shopping? No   Do you need help preparing meals?  No   Do you need help with housework?  No   Do you need help with laundry? No   Do you need help taking your medications? No   Do you need help managing money? No   Do you ever drive or ride in a car without wearing a seat belt? No   Have you felt unusual  stress, anger or loneliness in the last month? No   Who do you live with? Alone   If you need help, do you have trouble finding someone available to you? No   Have you been bothered in the last four weeks by sexual problems? No   Do you have difficulty concentrating, remembering or making decisions? Yes           Age-appropriate Screening Schedule:  Refer to the list below for future screening recommendations based on patient's age, sex and/or medical conditions. Orders for these recommended tests are listed in the plan section. The patient has been provided with a written plan.    Health Maintenance List  Health Maintenance   Topic Date Due   • HEPATITIS C SCREENING  Never done   • TDAP/TD VACCINES (1 - Tdap) 11/04/2025 (Originally 4/29/1964)   • COVID-19 Vaccine (7 - 2024-25 season) 05/15/2025   • INFLUENZA VACCINE  07/01/2025   • LIPID PANEL  07/03/2025   • ANNUAL WELLNESS VISIT  05/07/2026   • DXA SCAN  08/27/2026   • RSV Vaccine - Adults  Completed   • Pneumococcal Vaccine 50+  Completed   • ZOSTER VACCINE  Completed                                                                                                                                                CMS Preventative Services Quick Reference  Risk Factors Identified During Encounter  {Medicare Wellness Risk Factors:09463}    The above risks/problems have been discussed with the patient.  Pertinent information has been shared with the patient in the After Visit Summary.  An After Visit Summary and PPPS were made available to the patient.    Follow Up:{Wrapup  Review (Popup)  Advance Care Planning  Labs  CC  Problem List  Visit Diagnosis  Medications  Result Review  Imaging  [facility] Maintenance  Quality  BestPractice  SmartSets  SnapShot  Encounters  Notes  Media  Procedures :23}   Next Medicare Wellness visit to be scheduled in 1 year.         Additional E&M Note during same encounter follows:  Patient has additional, significant, and  "separately identifiable condition(s)/problem(s) that require work above and beyond the Medicare Wellness Visit     Chief Complaint  Medicare Wellness-subsequent    Subjective {Problem List  Visit Diagnosis   Encounters  Notes  Medications  Labs  Result Review Imaging  Media :23} {Help Text;  If performing a Preventative Medicine Visit (e.g. 99397) in addition to AWV, choose the 1st SmartList option below and document any ROS/PE performed.  If performing a separately identifiable E/M service (e.g. 92839), choose 2nd SmartLink option below. This information in red will not appear in the final note after note is signed:27132}  HPI  {AWV/Preventative Exam/EM Progress Note. Use this note if billing for additional Wellness Visit or EM exam in addition to AWV visit (Optional):[id number]}                 Objective   Vital Signs:  /64 (BP Location: Right arm, Patient Position: Sitting, Cuff Size: Adult)   Pulse 99   Ht 157.5 cm (62\")   Wt 51.9 kg (114 lb 6.4 oz)   SpO2 95%   BMI 20.92 kg/m²   Physical Exam          {The following data was reviewed by (Optional):53233}  {Data reviewed (Optional):10846:::1}  {Ambulatory Labs (Optional):87084}    Results             Assessment and Plan {CC Problem List  Visit Diagnosis   ROS  Review (Popup)  Health Maintenance  Quality  BestPractice  Medications  SmartSets  SnapShot Encounters  Media :23}{Help Text; When performing an adult Preventative Medicine Visit (e.g. 99397) using the optional SmartList below can help when documenting any age-appropriate advice given.  When using the SmartList review and update the information based on the unique discussion or advice given to the patient.  As a reminder, diagnosis code Z00.00 (nl exam) or Z00.01 (abnl exam), should be added when this service is performed.  Text will disappear once the note is signed:00050}{Preventative Physical Additional Health Advice List (Optional):[id number]}                 {Time Spent " (Optional):11042}  Follow Up {Instructions Charge Capture  Follow-up Communications :23}  No follow-ups on file.  Patient was given instructions and counseling regarding her condition or for health maintenance advice. Please see specific information pulled into the AVS if appropriate.  {MARITZA [employer] Provider Statement:82722}

## 2025-05-07 NOTE — PROGRESS NOTES
Subjective   The ABCs of the Annual Wellness Visit  Medicare Wellness Visit      Yaneth Anderson is a 80 y.o. patient who presents for a Medicare Wellness Visit.    The following portions of the patient's history were reviewed and   updated as appropriate: allergies, current medications, past family history, past medical history, past social history, past surgical history, and problem list.    Compared to one year ago, the patient's physical   health is the same.  Compared to one year ago, the patient's mental   health is the same.    Recent Hospitalizations:  She was not admitted to the hospital during the last year.     Current Medical Providers:  Patient Care Team:  Shola Stevenson DO as PCP - General (Family Medicine)    Outpatient Medications Prior to Visit   Medication Sig Dispense Refill    albuterol (PROVENTIL) (2.5 MG/3ML) 0.083% nebulizer solution Take 2.5 mg by nebulization Every 4 (Four) Hours As Needed for Shortness of Air. 120 mL 2    albuterol sulfate  (90 Base) MCG/ACT inhaler Inhale 2 puffs Every 6 (Six) Hours As Needed.      alendronate (FOSAMAX) 10 MG tablet Take 1 tablet by mouth Every Morning Before Breakfast. 90 tablet 3    aspirin 81 MG EC tablet Take 1 tablet by mouth Daily. 90 tablet 1    Biotin 10 MG tablet Take 10 mg by mouth Daily.      budesonide-formoterol (Symbicort) 160-4.5 MCG/ACT inhaler Inhale 2 puffs 2 (Two) Times a Day. 10.2 g 11    Calcium Carb-Cholecalciferol 600-5 MG-MCG tablet Take 1 tablet by mouth Daily.      Calcium Carbonate-Vit D-Min (Calcium 600 + Minerals) 600-200 MG-UNIT tablet Take 1 tablet by mouth Daily. 90 each 1    cyanocobalamin (VITAMIN B-12) 1000 MCG tablet Take 1 tablet by mouth Daily.      cyclobenzaprine (FLEXERIL) 10 MG tablet Take 1 tablet by mouth 3 (Three) Times a Day As Needed for Muscle Spasms. 90 tablet 1    meclizine (ANTIVERT) 12.5 MG tablet Take 1 tablet by mouth Every 8 (Eight) Hours As Needed for Dizziness. 90 tablet 1    metoprolol  "succinate XL (TOPROL-XL) 50 MG 24 hr tablet Take 1 tablet by mouth Daily. 90 tablet 3    rosuvastatin (CRESTOR) 20 MG tablet Take 1 tablet by mouth every night at bedtime. 90 tablet 3    valsartan (DIOVAN) 160 MG tablet Take 1 tablet by mouth Daily. 90 tablet 3     No facility-administered medications prior to visit.     No opioid medication identified on active medication list. I have reviewed chart for other potential  high risk medication/s and harmful drug interactions in the elderly.      Aspirin is on active medication list. Aspirin use is indicated based on review of current medical condition/s. Pros and cons of this therapy have been discussed today. Benefits of this medication outweigh potential harm.  Patient has been encouraged to continue taking this medication.  .      Patient Active Problem List   Diagnosis    Essential hypertension    Hyperlipidemia    History of CVA (cerebrovascular accident)    COPD (chronic obstructive pulmonary disease)    Age-related osteoporosis without fracture     Advance Care Planning Advance Directive is not on file.  ACP discussion was held with the patient during this visit. Patient does not have an advance directive, information provided.            Objective   Vitals:    05/07/25 1005   BP: 152/64   BP Location: Right arm   Patient Position: Sitting   Cuff Size: Adult   Pulse: 99   SpO2: 95%   Weight: 51.9 kg (114 lb 6.4 oz)   Height: 157.5 cm (62\")   PainSc: 0-No pain       Estimated body mass index is 20.92 kg/m² as calculated from the following:    Height as of this encounter: 157.5 cm (62\").    Weight as of this encounter: 51.9 kg (114 lb 6.4 oz).    BMI is within normal parameters. No other follow-up for BMI required.           Does the patient have evidence of cognitive impairment? No                                                                                                Health  Risk Assessment    Smoking Status:  Social History     Tobacco Use   Smoking " Status Former    Current packs/day: 0.00    Average packs/day: 1 pack/day for 34.1 years (34.1 ttl pk-yrs)    Types: Cigarettes    Start date: 1966    Quit date: 2000    Years since quittin.3    Passive exposure: Past   Smokeless Tobacco Never   Tobacco Comments    Approximate  dates     Alcohol Consumption:  Social History     Substance and Sexual Activity   Alcohol Use Never       Fall Risk Screen  STEADI Fall Risk Assessment was completed, and patient is at LOW risk for falls.Assessment completed on:2025    Depression Screening   Little interest or pleasure in doing things? Not at all   Feeling down, depressed, or hopeless? Not at all   PHQ-2 Total Score 0      Health Habits and Functional and Cognitive Screenin/7/2025    10:00 AM   Functional & Cognitive Status   Do you have difficulty preparing food and eating? No   Do you have difficulty bathing yourself, getting dressed or grooming yourself? No   Do you have difficulty using the toilet? No   Do you have difficulty moving around from place to place? No   Do you have trouble with steps or getting out of a bed or a chair? No   Current Diet Limited Junk Food   Dental Exam Up to date   Eye Exam Up to date   Exercise (times per week) 0 times per week   Do you need help using the phone?  No   Are you deaf or do you have serious difficulty hearing?  No   Do you need help to go to places out of walking distance? No   Do you need help shopping? No   Do you need help preparing meals?  No   Do you need help with housework?  No   Do you need help with laundry? No   Do you need help taking your medications? No   Do you need help managing money? No   Do you ever drive or ride in a car without wearing a seat belt? No   Have you felt unusual stress, anger or loneliness in the last month? No   Who do you live with? Alone   If you need help, do you have trouble finding someone available to you? No   Have you been bothered in the last four weeks by  sexual problems? No   Do you have difficulty concentrating, remembering or making decisions? Yes           Age-appropriate Screening Schedule:  Refer to the list below for future screening recommendations based on patient's age, sex and/or medical conditions. Orders for these recommended tests are listed in the plan section. The patient has been provided with a written plan.    Health Maintenance List  Health Maintenance   Topic Date Due    HEPATITIS C SCREENING  Never done    COVID-19 Vaccine (7 - 2024-25 season) 05/15/2025    TDAP/TD VACCINES (1 - Tdap) 11/04/2025 (Originally 4/29/1964)    INFLUENZA VACCINE  07/01/2025    LIPID PANEL  07/03/2025    ANNUAL WELLNESS VISIT  05/07/2026    DXA SCAN  08/27/2026    RSV Vaccine - Adults  Completed    Pneumococcal Vaccine 50+  Completed    ZOSTER VACCINE  Completed                                                                                                                                                CMS Preventative Services Quick Reference  Risk Factors Identified During Encounter  None Identified    The above risks/problems have been discussed with the patient.  Pertinent information has been shared with the patient in the After Visit Summary.  An After Visit Summary and PPPS were made available to the patient.    Follow Up:   Next Medicare Wellness visit to be scheduled in 1 year.         Additional E&M Note during same encounter follows:  Patient has additional, significant, and separately identifiable condition(s)/problem(s) that require work above and beyond the Medicare Wellness Visit     Chief Complaint  Medicare Wellness-subsequent    Subjective    HPI  Yaneth is also being seen today for additional medical problem/s.           Patient dealing with vertigo recently, she notes chronic history of motion sickness and vertigo that intermittently flares.  She is concern for possible abdominal hernias, she denies any past imaging, notes that they are painful at  "times notes that she feels a fullness in the area.  Do not feel any areas of bulging on exam but her concern is consistent with a hernia we can do imaging to further evaluate.    History of hypertension and hyperlipidemia, labs pending  History of COPD, we previously tried to get a triple inhaler however that was not covered, patient notes benefit with her inhalers but states her symptoms are not fully controlled.  We have not tried to send it since last year so we will reattempt that.        Objective   Vital Signs:  /64 (BP Location: Right arm, Patient Position: Sitting, Cuff Size: Adult)   Pulse 99   Ht 157.5 cm (62\")   Wt 51.9 kg (114 lb 6.4 oz)   SpO2 95%   BMI 20.92 kg/m²   Physical Exam  Vitals reviewed.   Constitutional:       General: She is not in acute distress.     Appearance: Normal appearance.   HENT:      Head: Normocephalic and atraumatic.      Mouth/Throat:      Mouth: Mucous membranes are moist.   Eyes:      Conjunctiva/sclera: Conjunctivae normal.   Cardiovascular:      Rate and Rhythm: Normal rate and regular rhythm.      Heart sounds: Normal heart sounds.   Pulmonary:      Effort: Pulmonary effort is normal.      Breath sounds: Normal breath sounds.   Abdominal:      General: Abdomen is flat.      Palpations: Abdomen is soft. There is no mass.      Tenderness: There is abdominal tenderness. There is no guarding or rebound.      Comments: Patient notes areas of tenderness when she feels that she has hernias, I do not feel any on exam but if patient is having areas of bulging and pain it is worth pursuing imaging.   Musculoskeletal:         General: No swelling.      Cervical back: Normal range of motion and neck supple.   Skin:     General: Skin is warm.   Neurological:      General: No focal deficit present.      Mental Status: She is alert.   Psychiatric:         Mood and Affect: Mood normal.         Behavior: Behavior normal.                       Results             Assessment and " Plan           Diagnoses and all orders for this visit:    1. Medicare annual wellness visit, subsequent (Primary)    2. Vertigo    3. Motion sickness, initial encounter  -     Scopolamine 1 MG/3DAYS patch; Place 1 patch on the skin as directed by provider Every 72 (Seventy-Two) Hours for 15 days.  Dispense: 5 patch; Refill: 0    4. Essential hypertension  -     CBC w AUTO Differential; Future  -     Comprehensive metabolic panel; Future  -     Microalbumin / Creatinine Urine Ratio - Urine, Clean Catch; Future    5. Pure hypercholesterolemia  -     CBC w AUTO Differential; Future  -     Comprehensive metabolic panel; Future  -     Lipid panel; Future    6. Chronic bronchitis, unspecified chronic bronchitis type  -     Budeson-Glycopyrrol-Formoterol (BREZTRI) 160-9-4.8 MCG/ACT aerosol inhaler; Inhale 2 puffs 2 (Two) Times a Day.  Dispense: 10.7 g; Refill: 5    7. Abdominal pain, unspecified abdominal location  -     US Abdomen Limited; Future    8. Encounter for screening mammogram for malignant neoplasm of breast  -     Mammo Screening Digital Tomosynthesis Bilateral With CAD; Future      Patient Instructions   Motion sickness meds:    Meclizine(non drowsy dramamine)  Diphenhydramine (regular Dramamine)  Scoloplamine patches - good for 72 hours.       Reminder to complete fasting labs after July.           Follow Up   Return in about 6 months (around 11/7/2025) for Recheck.  Patient was given instructions and counseling regarding her condition or for health maintenance advice. Please see specific information pulled into the AVS if appropriate.

## 2025-05-23 ENCOUNTER — TELEPHONE (OUTPATIENT)
Dept: FAMILY MEDICINE CLINIC | Facility: CLINIC | Age: 80
End: 2025-05-23

## 2025-05-27 NOTE — TELEPHONE ENCOUNTER
"Relay     \"Pt's last mammogram 12/26/24 and needs to schedule after that date. Dr gonzalez just put in a new order to prevent any delay in scheduling. \"    Left detailed message                   "

## 2025-05-29 ENCOUNTER — LAB (OUTPATIENT)
Dept: LAB | Facility: HOSPITAL | Age: 80
End: 2025-05-29
Payer: MEDICARE

## 2025-05-29 DIAGNOSIS — I10 ESSENTIAL HYPERTENSION: ICD-10-CM

## 2025-05-29 DIAGNOSIS — E78.00 PURE HYPERCHOLESTEROLEMIA: ICD-10-CM

## 2025-05-29 LAB
ALBUMIN SERPL-MCNC: 4.4 G/DL (ref 3.5–5.2)
ALBUMIN UR-MCNC: <1.2 MG/DL
ALBUMIN/GLOB SERPL: 1.6 G/DL
ALP SERPL-CCNC: 61 U/L (ref 39–117)
ALT SERPL W P-5'-P-CCNC: 20 U/L (ref 1–33)
ANION GAP SERPL CALCULATED.3IONS-SCNC: 9.7 MMOL/L (ref 5–15)
AST SERPL-CCNC: 31 U/L (ref 1–32)
BASOPHILS # BLD AUTO: 0.06 10*3/MM3 (ref 0–0.2)
BASOPHILS NFR BLD AUTO: 0.7 % (ref 0–1.5)
BILIRUB SERPL-MCNC: 0.2 MG/DL (ref 0–1.2)
BUN SERPL-MCNC: 20 MG/DL (ref 8–23)
BUN/CREAT SERPL: 20.4 (ref 7–25)
CALCIUM SPEC-SCNC: 10.4 MG/DL (ref 8.6–10.5)
CHLORIDE SERPL-SCNC: 100 MMOL/L (ref 98–107)
CHOLEST SERPL-MCNC: 167 MG/DL (ref 0–200)
CO2 SERPL-SCNC: 26.3 MMOL/L (ref 22–29)
CREAT SERPL-MCNC: 0.98 MG/DL (ref 0.57–1)
CREAT UR-MCNC: 23.9 MG/DL
DEPRECATED RDW RBC AUTO: 41.5 FL (ref 37–54)
EGFRCR SERPLBLD CKD-EPI 2021: 58.8 ML/MIN/1.73
EOSINOPHIL # BLD AUTO: 0.29 10*3/MM3 (ref 0–0.4)
EOSINOPHIL NFR BLD AUTO: 3.1 % (ref 0.3–6.2)
ERYTHROCYTE [DISTWIDTH] IN BLOOD BY AUTOMATED COUNT: 12.6 % (ref 12.3–15.4)
GLOBULIN UR ELPH-MCNC: 2.8 GM/DL
GLUCOSE SERPL-MCNC: 93 MG/DL (ref 65–99)
HCT VFR BLD AUTO: 46.4 % (ref 34–46.6)
HDLC SERPL-MCNC: 81 MG/DL (ref 40–60)
HGB BLD-MCNC: 15.1 G/DL (ref 12–15.9)
IMM GRANULOCYTES # BLD AUTO: 0.02 10*3/MM3 (ref 0–0.05)
IMM GRANULOCYTES NFR BLD AUTO: 0.2 % (ref 0–0.5)
LDLC SERPL CALC-MCNC: 68 MG/DL (ref 0–100)
LDLC/HDLC SERPL: 0.8 {RATIO}
LYMPHOCYTES # BLD AUTO: 2.87 10*3/MM3 (ref 0.7–3.1)
LYMPHOCYTES NFR BLD AUTO: 31.1 % (ref 19.6–45.3)
MCH RBC QN AUTO: 29.4 PG (ref 26.6–33)
MCHC RBC AUTO-ENTMCNC: 32.5 G/DL (ref 31.5–35.7)
MCV RBC AUTO: 90.3 FL (ref 79–97)
MICROALBUMIN/CREAT UR: NORMAL MG/G{CREAT}
MONOCYTES # BLD AUTO: 0.72 10*3/MM3 (ref 0.1–0.9)
MONOCYTES NFR BLD AUTO: 7.8 % (ref 5–12)
NEUTROPHILS NFR BLD AUTO: 5.26 10*3/MM3 (ref 1.7–7)
NEUTROPHILS NFR BLD AUTO: 57.1 % (ref 42.7–76)
NRBC BLD AUTO-RTO: 0 /100 WBC (ref 0–0.2)
PLATELET # BLD AUTO: 287 10*3/MM3 (ref 140–450)
PMV BLD AUTO: 11.3 FL (ref 6–12)
POTASSIUM SERPL-SCNC: 4.8 MMOL/L (ref 3.5–5.2)
PROT SERPL-MCNC: 7.2 G/DL (ref 6–8.5)
RBC # BLD AUTO: 5.14 10*6/MM3 (ref 3.77–5.28)
SODIUM SERPL-SCNC: 136 MMOL/L (ref 136–145)
TRIGL SERPL-MCNC: 104 MG/DL (ref 0–150)
VLDLC SERPL-MCNC: 18 MG/DL (ref 5–40)
WBC NRBC COR # BLD AUTO: 9.22 10*3/MM3 (ref 3.4–10.8)

## 2025-05-29 PROCEDURE — 82570 ASSAY OF URINE CREATININE: CPT

## 2025-05-29 PROCEDURE — 36415 COLL VENOUS BLD VENIPUNCTURE: CPT

## 2025-05-29 PROCEDURE — 80061 LIPID PANEL: CPT

## 2025-05-29 PROCEDURE — 80053 COMPREHEN METABOLIC PANEL: CPT

## 2025-05-29 PROCEDURE — 82043 UR ALBUMIN QUANTITATIVE: CPT

## 2025-05-29 PROCEDURE — 85025 COMPLETE CBC W/AUTO DIFF WBC: CPT

## 2025-06-13 ENCOUNTER — HOSPITAL ENCOUNTER (OUTPATIENT)
Dept: ULTRASOUND IMAGING | Facility: HOSPITAL | Age: 80
Discharge: HOME | End: 2025-06-13
Payer: MEDICARE

## 2025-06-13 DIAGNOSIS — R10.9 ABDOMINAL PAIN, UNSPECIFIED ABDOMINAL LOCATION: ICD-10-CM

## 2025-06-13 PROCEDURE — 76705 ECHO EXAM OF ABDOMEN: CPT
